# Patient Record
Sex: FEMALE | Race: OTHER | Employment: FULL TIME | URBAN - METROPOLITAN AREA
[De-identification: names, ages, dates, MRNs, and addresses within clinical notes are randomized per-mention and may not be internally consistent; named-entity substitution may affect disease eponyms.]

---

## 2020-09-15 ENCOUNTER — HOSPITAL ENCOUNTER (OUTPATIENT)
Age: 40
Discharge: HOME | End: 2020-09-15
Attending: EMERGENCY MEDICINE
Payer: COMMERCIAL

## 2020-09-15 VITALS
RESPIRATION RATE: 16 BRPM | BODY MASS INDEX: 31.58 KG/M2 | HEART RATE: 96 BPM | OXYGEN SATURATION: 98 % | DIASTOLIC BLOOD PRESSURE: 68 MMHG | SYSTOLIC BLOOD PRESSURE: 127 MMHG | WEIGHT: 185 LBS | HEIGHT: 64 IN | TEMPERATURE: 97 F

## 2020-09-15 DIAGNOSIS — Z20.822 EXPOSURE TO COVID-19 VIRUS: Primary | ICD-10-CM

## 2020-09-15 PROCEDURE — U0003 INFECTIOUS AGENT DETECTION BY NUCLEIC ACID (DNA OR RNA); SEVERE ACUTE RESPIRATORY SYNDROME CORONAVIRUS 2 (SARS-COV-2) (CORONAVIRUS DISEASE [COVID-19]), AMPLIFIED PROBE TECHNIQUE, MAKING USE OF HIGH THROUGHPUT TECHNOLOGIES AS DESCRIBED BY CMS-2020-01-R: HCPCS | Performed by: EMERGENCY MEDICINE

## 2020-09-15 PROCEDURE — 99202 OFFICE O/P NEW SF 15 MIN: CPT | Performed by: EMERGENCY MEDICINE

## 2020-09-15 NOTE — ED PROVIDER NOTES
Patient Seen in: [facility] In [address] [place] [address]      History   Patient presents with:  Testing    Stated Complaint: exposed/wants testing    HPI  Patient states she was exposed to work to an individual who has tested positive.   She last saw rhythm. Heart sounds: Normal heart sounds. Pulmonary:      Effort: Pulmonary effort is normal.      Breath sounds: Normal breath sounds. Abdominal:      Palpations: Abdomen is soft. Tenderness: There is no tenderness.    Musculoskeletal: Primitivo Magaña

## 2020-09-20 LAB — SARS-COV-2 BY PCR: NOT DETECTED

## 2021-05-10 ENCOUNTER — OFFICE VISIT (OUTPATIENT)
Dept: FAMILY MEDICINE CLINIC | Facility: CLINIC | Age: 41
End: 2021-05-10
Payer: COMMERCIAL

## 2021-05-10 VITALS
HEIGHT: 64 IN | BODY MASS INDEX: 35.51 KG/M2 | DIASTOLIC BLOOD PRESSURE: 77 MMHG | SYSTOLIC BLOOD PRESSURE: 120 MMHG | HEART RATE: 90 BPM | WEIGHT: 208 LBS | TEMPERATURE: 97 F

## 2021-05-10 DIAGNOSIS — R10.12 LUQ ABDOMINAL PAIN: Primary | ICD-10-CM

## 2021-05-10 DIAGNOSIS — Z98.890 HISTORY OF REMOVAL OF OVARIAN CYST: ICD-10-CM

## 2021-05-10 DIAGNOSIS — Z12.31 ENCOUNTER FOR SCREENING MAMMOGRAM FOR MALIGNANT NEOPLASM OF BREAST: ICD-10-CM

## 2021-05-10 DIAGNOSIS — Z87.42 HISTORY OF REMOVAL OF OVARIAN CYST: ICD-10-CM

## 2021-05-10 PROCEDURE — 3008F BODY MASS INDEX DOCD: CPT | Performed by: FAMILY MEDICINE

## 2021-05-10 PROCEDURE — 3074F SYST BP LT 130 MM HG: CPT | Performed by: FAMILY MEDICINE

## 2021-05-10 PROCEDURE — 99204 OFFICE O/P NEW MOD 45 MIN: CPT | Performed by: FAMILY MEDICINE

## 2021-05-10 PROCEDURE — 3078F DIAST BP <80 MM HG: CPT | Performed by: FAMILY MEDICINE

## 2021-05-10 RX ORDER — AMOXICILLIN AND CLAVULANATE POTASSIUM 875; 125 MG/1; MG/1
1 TABLET, FILM COATED ORAL EVERY 12 HOURS
COMMUNITY
Start: 2021-04-20 | End: 2021-05-10

## 2021-05-10 RX ORDER — CHOLECALCIFEROL (VITAMIN D3) 1250 MCG
1 CAPSULE ORAL WEEKLY
COMMUNITY
Start: 2021-04-21 | End: 2022-01-28

## 2021-05-10 NOTE — PROGRESS NOTES
HPI:   Mirela Flowers is a 36year old female who presents for an establish care visit and with complaints of acute abdominal pain that started 6 weeks ago.     Complaining of L side upper quadrant abdominal pain described as a dull ache that is non-radi lb (94.3 kg)   LMP 05/01/2021 (Approximate)   BMI 35.70 kg/m²     GENERAL: well developed, well nourished, in no apparent distress  SKIN: no rashes, no suspicious lesions  HEENT: atraumatic, normocephalic, ears are clear  EYES: PERRLA, EOMI,conjunctiva are

## 2021-05-12 ENCOUNTER — TELEPHONE (OUTPATIENT)
Dept: FAMILY MEDICINE CLINIC | Facility: CLINIC | Age: 41
End: 2021-05-12

## 2021-05-12 NOTE — TELEPHONE ENCOUNTER
Patient's  called and advised the patient wants to have the CT Scan done at an imaging site closer to them. Patient is going to go to [address]. Which is not far from their house.  Patient wanted to know if we could put the

## 2021-05-14 ENCOUNTER — TELEPHONE (OUTPATIENT)
Dept: FAMILY MEDICINE CLINIC | Facility: CLINIC | Age: 41
End: 2021-05-14

## 2021-05-14 NOTE — TELEPHONE ENCOUNTER
I spoke to patient and she said she received a letter for work with weight restrictions but it doesn't say the amount of weight she can lift.  Can she have a letter stating the weight restrictions

## 2021-05-14 NOTE — TELEPHONE ENCOUNTER
Spoke to patient to inform her that her order was sent through [facility] so she can print it out.

## 2021-05-14 NOTE — TELEPHONE ENCOUNTER
Okay to generate work note stating 10 pound lifting restriction. Please inform patient/leave for pickup/MyChart depending on patient preference.

## 2021-05-15 ENCOUNTER — HOSPITAL ENCOUNTER (OUTPATIENT)
Dept: MAMMOGRAPHY | Age: 41
Discharge: HOME | End: 2021-05-15
Attending: FAMILY MEDICINE
Payer: COMMERCIAL

## 2021-05-15 DIAGNOSIS — Z12.31 ENCOUNTER FOR SCREENING MAMMOGRAM FOR MALIGNANT NEOPLASM OF BREAST: ICD-10-CM

## 2021-05-15 PROCEDURE — 77063 BREAST TOMOSYNTHESIS BI: CPT | Performed by: FAMILY MEDICINE

## 2021-05-15 PROCEDURE — 77067 SCR MAMMO BI INCL CAD: CPT | Performed by: FAMILY MEDICINE

## 2021-06-02 ENCOUNTER — TELEPHONE (OUTPATIENT)
Dept: CASE MANAGEMENT | Age: 41
End: 2021-06-02

## 2021-06-02 NOTE — TELEPHONE ENCOUNTER
Renee Granda from [facility] calling in regards to pt CT. Renee Granda looking for authorization. Please advise pt.

## 2021-06-03 ENCOUNTER — TELEPHONE (OUTPATIENT)
Dept: FAMILY MEDICINE CLINIC | Facility: CLINIC | Age: 41
End: 2021-06-03

## 2021-06-03 NOTE — TELEPHONE ENCOUNTER
Denial     CT Abdomen and Pelvis     The health plan has denied request for above test, as criteria not met. If you wish you may contact [facility] for a physician review at [phone]. Also, please call patient with plan of care.      Thank you, Cruzito

## 2021-06-14 ENCOUNTER — TELEPHONE (OUTPATIENT)
Dept: FAMILY MEDICINE CLINIC | Facility: CLINIC | Age: 41
End: 2021-06-14

## 2021-06-14 NOTE — TELEPHONE ENCOUNTER
Patient's spouse, on ASHLEY, is requesting a call to discuss CT results. Per spouse, patient completed test on 6/4 at [facility]. Please advise.

## 2021-06-15 NOTE — TELEPHONE ENCOUNTER
Left message for patient. Need to know what location patient went to do the CT scan in order to try to request results.

## 2021-06-16 NOTE — TELEPHONE ENCOUNTER
Clinical Staff on site: [address] [place] [address] [address]. Closed. Hrs 8:30-4:30pm.      However, recording does state if doctors office is calling for request, FAX Request to Medical [place] dept at [address]  Please assist.

## 2021-06-16 NOTE — TELEPHONE ENCOUNTER
Patient went to [facility] in [facility].  Patient does not remember exact address

## 2021-06-18 ENCOUNTER — LAB ENCOUNTER (OUTPATIENT)
Dept: LAB | Age: 41
End: 2021-06-18
Attending: FAMILY MEDICINE
Payer: COMMERCIAL

## 2021-06-18 ENCOUNTER — OFFICE VISIT (OUTPATIENT)
Dept: FAMILY MEDICINE CLINIC | Facility: CLINIC | Age: 41
End: 2021-06-18
Payer: COMMERCIAL

## 2021-06-18 VITALS
BODY MASS INDEX: 35.17 KG/M2 | TEMPERATURE: 98 F | HEIGHT: 64 IN | WEIGHT: 206 LBS | DIASTOLIC BLOOD PRESSURE: 78 MMHG | SYSTOLIC BLOOD PRESSURE: 121 MMHG | HEART RATE: 91 BPM

## 2021-06-18 DIAGNOSIS — R10.12 LUQ ABDOMINAL PAIN: ICD-10-CM

## 2021-06-18 DIAGNOSIS — K76.0 HEPATIC STEATOSIS: ICD-10-CM

## 2021-06-18 DIAGNOSIS — R73.03 PREDIABETES: ICD-10-CM

## 2021-06-18 DIAGNOSIS — Z00.00 ANNUAL PHYSICAL EXAM: ICD-10-CM

## 2021-06-18 DIAGNOSIS — I51.7 CARDIOMEGALY: ICD-10-CM

## 2021-06-18 DIAGNOSIS — Z00.00 ANNUAL PHYSICAL EXAM: Primary | ICD-10-CM

## 2021-06-18 DIAGNOSIS — Q63.1 HORSESHOE KIDNEY: ICD-10-CM

## 2021-06-18 DIAGNOSIS — R12 HEARTBURN: ICD-10-CM

## 2021-06-18 DIAGNOSIS — R06.02 SOB (SHORTNESS OF BREATH) ON EXERTION: ICD-10-CM

## 2021-06-18 PROCEDURE — 83036 HEMOGLOBIN GLYCOSYLATED A1C: CPT

## 2021-06-18 PROCEDURE — 99396 PREV VISIT EST AGE 40-64: CPT | Performed by: FAMILY MEDICINE

## 2021-06-18 PROCEDURE — 3074F SYST BP LT 130 MM HG: CPT | Performed by: FAMILY MEDICINE

## 2021-06-18 PROCEDURE — 3051F HG A1C>EQUAL 7.0%<8.0%: CPT | Performed by: FAMILY MEDICINE

## 2021-06-18 PROCEDURE — 80053 COMPREHEN METABOLIC PANEL: CPT

## 2021-06-18 PROCEDURE — 3078F DIAST BP <80 MM HG: CPT | Performed by: FAMILY MEDICINE

## 2021-06-18 PROCEDURE — 80061 LIPID PANEL: CPT

## 2021-06-18 PROCEDURE — 3008F BODY MASS INDEX DOCD: CPT | Performed by: FAMILY MEDICINE

## 2021-06-18 PROCEDURE — 99213 OFFICE O/P EST LOW 20 MIN: CPT | Performed by: FAMILY MEDICINE

## 2021-06-18 PROCEDURE — 84443 ASSAY THYROID STIM HORMONE: CPT

## 2021-06-18 PROCEDURE — 85025 COMPLETE CBC W/AUTO DIFF WBC: CPT

## 2021-06-18 PROCEDURE — 82306 VITAMIN D 25 HYDROXY: CPT

## 2021-06-18 PROCEDURE — 36415 COLL VENOUS BLD VENIPUNCTURE: CPT

## 2021-06-18 RX ORDER — PANTOPRAZOLE SODIUM 40 MG/1
40 TABLET, DELAYED RELEASE ORAL
Qty: 30 TABLET | Refills: 0 | Status: SHIPPED | OUTPATIENT
Start: 2021-06-18 | End: 2021-10-29

## 2021-06-18 NOTE — PROGRESS NOTES
HPI:   Isrrael Joseph is a 36year old female who presents for a complete physical exam.    Work: Works as a factory  Social: Lives with family. Diet: eats home cooked meals  Exercise: Sedentary   GYN history:   LMP: 21  Periods are regular. [facility] denies orthopnea  CARDIOVASCULAR: denies chest pain on exertion  GI: denies abdominal pain,denies heartburn  : denies dysuria, vaginal discharge or itching  MUSCULOSKELETAL: denies back pain  NEURO: denies headaches  PSYCHE: denies depression or anxiety CTAP    7. SOB (shortness of breath) on exertion  -Cardiomegaly noted on CTAP  - CARD ECHO 2D DOPPLER (CPT=93306); Future    8. LUQ abdominal pain  -CTAP results discussed with patient. Advised patient to follow-up with GI for possible endoscopy.

## 2021-06-25 PROBLEM — E11.65 TYPE 2 DIABETES MELLITUS WITH HYPERGLYCEMIA, WITHOUT LONG-TERM CURRENT USE OF INSULIN (HCC): Status: ACTIVE | Noted: 2021-06-25

## 2021-06-25 PROBLEM — R73.03 PREDIABETES: Status: RESOLVED | Noted: 2021-06-18 | Resolved: 2021-06-25

## 2021-06-25 PROBLEM — E78.2 MIXED HYPERLIPIDEMIA: Status: ACTIVE | Noted: 2021-06-25

## 2021-06-28 ENCOUNTER — TELEPHONE (OUTPATIENT)
Dept: FAMILY MEDICINE CLINIC | Facility: CLINIC | Age: 41
End: 2021-06-28

## 2021-06-28 NOTE — TELEPHONE ENCOUNTER
states just spoke with nurse and was asked to schedule a follow up appointment for patient to discus results. Patient is able to get off work today to come in.  was advised Dr. Muna Husain does not have any openings today but availability as soon as tomorrow.  would like to know if doctor is able to accommodate today as patient unable to get off work all other days.  was advised message will be sent.

## 2021-06-28 NOTE — TELEPHONE ENCOUNTER
Not able to accommodate patient today. Visit is for lab work follow-up for new diabetes diagnosis. Not urgent, does not need to be seen in the next 24 to 48 hours, can be seen anytime this week or next week. I am here Thursday evenings which may work better for her work schedule.

## 2021-07-01 ENCOUNTER — OFFICE VISIT (OUTPATIENT)
Dept: FAMILY MEDICINE CLINIC | Facility: CLINIC | Age: 41
End: 2021-07-01
Payer: COMMERCIAL

## 2021-07-01 VITALS
TEMPERATURE: 98 F | DIASTOLIC BLOOD PRESSURE: 80 MMHG | BODY MASS INDEX: 35.85 KG/M2 | WEIGHT: 210 LBS | HEIGHT: 64 IN | SYSTOLIC BLOOD PRESSURE: 122 MMHG | HEART RATE: 105 BPM

## 2021-07-01 DIAGNOSIS — K76.0 HEPATIC STEATOSIS: ICD-10-CM

## 2021-07-01 DIAGNOSIS — E78.2 MIXED HYPERLIPIDEMIA: Primary | ICD-10-CM

## 2021-07-01 DIAGNOSIS — E11.65 TYPE 2 DIABETES MELLITUS WITH HYPERGLYCEMIA, WITHOUT LONG-TERM CURRENT USE OF INSULIN (HCC): ICD-10-CM

## 2021-07-01 DIAGNOSIS — R10.12 LUQ ABDOMINAL PAIN: ICD-10-CM

## 2021-07-01 PROCEDURE — 3079F DIAST BP 80-89 MM HG: CPT | Performed by: FAMILY MEDICINE

## 2021-07-01 PROCEDURE — 3074F SYST BP LT 130 MM HG: CPT | Performed by: FAMILY MEDICINE

## 2021-07-01 PROCEDURE — 3008F BODY MASS INDEX DOCD: CPT | Performed by: FAMILY MEDICINE

## 2021-07-01 PROCEDURE — 99214 OFFICE O/P EST MOD 30 MIN: CPT | Performed by: FAMILY MEDICINE

## 2021-07-01 RX ORDER — LISINOPRIL 10 MG/1
10 TABLET ORAL DAILY
Qty: 90 TABLET | Refills: 0 | Status: SHIPPED | OUTPATIENT
Start: 2021-07-01 | End: 2022-06-26

## 2021-07-01 RX ORDER — ATORVASTATIN CALCIUM 20 MG/1
20 TABLET, FILM COATED ORAL NIGHTLY
Qty: 90 TABLET | Refills: 0 | Status: SHIPPED | OUTPATIENT
Start: 2021-07-01 | End: 2021-10-29

## 2021-07-01 NOTE — PROGRESS NOTES
Patient presents with: Follow - Up: lab results, continues to have LUQ pain    HPI:   Bean Claros is a 36year old female who presents to clinic for lab work follow-up. New diagnosis of type 2 diabetes, mixed hyperlipidemia.   Patient's last A1c was fruits, vegetables, fiber, calcium, iron, and 1 mg of folate supplement per day (for females capable of pregnancy). - Exercise: Stressed the importance of regular exercise. - metFORMIN HCl 500 MG Oral Tab;  Take 1 tablet (500 mg total) by mouth daily wi

## 2021-10-29 ENCOUNTER — LAB ENCOUNTER (OUTPATIENT)
Dept: LAB | Age: 41
End: 2021-10-29
Attending: FAMILY MEDICINE
Payer: COMMERCIAL

## 2021-10-29 ENCOUNTER — OFFICE VISIT (OUTPATIENT)
Dept: FAMILY MEDICINE CLINIC | Facility: CLINIC | Age: 41
End: 2021-10-29
Payer: COMMERCIAL

## 2021-10-29 VITALS
HEART RATE: 80 BPM | DIASTOLIC BLOOD PRESSURE: 72 MMHG | WEIGHT: 206.63 LBS | BODY MASS INDEX: 35.28 KG/M2 | HEIGHT: 64 IN | TEMPERATURE: 98 F | SYSTOLIC BLOOD PRESSURE: 109 MMHG

## 2021-10-29 DIAGNOSIS — E11.65 TYPE 2 DIABETES MELLITUS WITH HYPERGLYCEMIA, WITHOUT LONG-TERM CURRENT USE OF INSULIN (HCC): ICD-10-CM

## 2021-10-29 DIAGNOSIS — M25.512 ACUTE PAIN OF BOTH SHOULDERS: ICD-10-CM

## 2021-10-29 DIAGNOSIS — K76.0 HEPATIC STEATOSIS: ICD-10-CM

## 2021-10-29 DIAGNOSIS — R05.9 COUGH: Primary | ICD-10-CM

## 2021-10-29 DIAGNOSIS — M25.511 ACUTE PAIN OF BOTH SHOULDERS: ICD-10-CM

## 2021-10-29 DIAGNOSIS — R12 HEARTBURN: ICD-10-CM

## 2021-10-29 DIAGNOSIS — E78.2 MIXED HYPERLIPIDEMIA: ICD-10-CM

## 2021-10-29 PROCEDURE — 83036 HEMOGLOBIN GLYCOSYLATED A1C: CPT

## 2021-10-29 PROCEDURE — 80061 LIPID PANEL: CPT

## 2021-10-29 PROCEDURE — 36415 COLL VENOUS BLD VENIPUNCTURE: CPT

## 2021-10-29 PROCEDURE — 3074F SYST BP LT 130 MM HG: CPT | Performed by: FAMILY MEDICINE

## 2021-10-29 PROCEDURE — 3078F DIAST BP <80 MM HG: CPT | Performed by: FAMILY MEDICINE

## 2021-10-29 PROCEDURE — 3008F BODY MASS INDEX DOCD: CPT | Performed by: FAMILY MEDICINE

## 2021-10-29 PROCEDURE — 99214 OFFICE O/P EST MOD 30 MIN: CPT | Performed by: FAMILY MEDICINE

## 2021-10-29 PROCEDURE — 3044F HG A1C LEVEL LT 7.0%: CPT | Performed by: PHYSICIAN ASSISTANT

## 2021-10-29 PROCEDURE — 80053 COMPREHEN METABOLIC PANEL: CPT

## 2021-10-29 RX ORDER — ATORVASTATIN CALCIUM 20 MG/1
20 TABLET, FILM COATED ORAL NIGHTLY
Qty: 90 TABLET | Refills: 0 | Status: SHIPPED | OUTPATIENT
Start: 2021-10-29 | End: 2022-01-24

## 2021-10-29 RX ORDER — PANTOPRAZOLE SODIUM 40 MG/1
40 TABLET, DELAYED RELEASE ORAL
Qty: 90 TABLET | Refills: 0 | Status: SHIPPED | OUTPATIENT
Start: 2021-10-29 | End: 2022-01-24

## 2021-10-29 RX ORDER — LEVOCETIRIZINE DIHYDROCHLORIDE 5 MG/1
5 TABLET, FILM COATED ORAL EVERY EVENING
Qty: 30 TABLET | Refills: 0 | Status: SHIPPED | OUTPATIENT
Start: 2021-10-29

## 2021-10-29 NOTE — PROGRESS NOTES
Patient presents with: Follow - Up: Diabetes  Cough: Dry cough causing vomit    HPI:   Suni Blas is a 36year old female who presents to clinic for diabetes follow-up. Her last A1c was 7% was started on Metformin 500 daily.   States that she has n Dispense: 90 tablet; Refill: 0  - LIPID PANEL; Future    3. Hepatic steatosis  - atorvastatin 20 MG Oral Tab; Take 1 tablet (20 mg total) by mouth nightly. Dispense: 90 tablet; Refill: 0  - COMP METABOLIC PANEL (14); Future    4.  Heartburn  - pantoprazole

## 2021-11-05 ENCOUNTER — OFFICE VISIT (OUTPATIENT)
Dept: FAMILY MEDICINE CLINIC | Facility: CLINIC | Age: 41
End: 2021-11-05
Payer: COMMERCIAL

## 2021-11-05 ENCOUNTER — TELEPHONE (OUTPATIENT)
Dept: FAMILY MEDICINE CLINIC | Facility: CLINIC | Age: 41
End: 2021-11-05

## 2021-11-05 ENCOUNTER — NURSE TRIAGE (OUTPATIENT)
Dept: FAMILY MEDICINE CLINIC | Facility: CLINIC | Age: 41
End: 2021-11-05

## 2021-11-05 VITALS
WEIGHT: 207.56 LBS | HEART RATE: 76 BPM | DIASTOLIC BLOOD PRESSURE: 82 MMHG | SYSTOLIC BLOOD PRESSURE: 122 MMHG | HEIGHT: 64 IN | BODY MASS INDEX: 35.44 KG/M2

## 2021-11-05 DIAGNOSIS — M79.601 RIGHT ARM PAIN: Primary | ICD-10-CM

## 2021-11-05 PROCEDURE — 99213 OFFICE O/P EST LOW 20 MIN: CPT | Performed by: PHYSICIAN ASSISTANT

## 2021-11-05 PROCEDURE — 3074F SYST BP LT 130 MM HG: CPT | Performed by: PHYSICIAN ASSISTANT

## 2021-11-05 PROCEDURE — 3008F BODY MASS INDEX DOCD: CPT | Performed by: PHYSICIAN ASSISTANT

## 2021-11-05 PROCEDURE — 3079F DIAST BP 80-89 MM HG: CPT | Performed by: PHYSICIAN ASSISTANT

## 2021-11-05 NOTE — PROGRESS NOTES
HPI:     HPI   36year-old female is here in the office complaining of right arm pain for the past week after blood draw. Patient states that she has pain and swelling in the right hand, patient has been taking Tylenol and icing.  The symptoms are getting b Date   •      • Other surgical history  2017    ovarian cyst       Family History:   History reviewed. No pertinent family history.     Social History:   Social History    Socioeconomic History      Marital status:       Spouse name: Not Places Lived in the Last Year: Not on file      Unstable Housing in the Last Year: Not on file    Review of Systems:   Review of Systems   Constitutional: Negative for activity change, chills, fatigue and fever.    HENT: Negative for congestion, ear dischar Relevant Orders    US ARM RIGHT COMPLETE (YFL=[id number])      Advise patient to take Ibuprofen 600 mg PO TID with food and apply ice compresses. Discussed plan of care with pt and pt is in agreement. All questions answered.  Pt to call with questions or kan

## 2021-11-05 NOTE — TELEPHONE ENCOUNTER
Action Requested: Summary for Provider     []  Critical Lab, Recommendations Needed  [] Need Additional Advice  [x]   FYI    []   Need Orders  [] Need Medications Sent to Pharmacy  []  Other     SUMMARY: With  NC#[id number]    Patient stat

## 2021-11-05 NOTE — TELEPHONE ENCOUNTER
Ruthie/ [facility] needs to clarify the order for patient's ultrasound of the right arm. Patient has an appointment today 11/05 at 3:00pm.    CSS contacted site, site advised patient is going to [facility] instead.  Please disregard

## 2022-01-24 DIAGNOSIS — K76.0 HEPATIC STEATOSIS: ICD-10-CM

## 2022-01-24 DIAGNOSIS — R12 HEARTBURN: ICD-10-CM

## 2022-01-24 DIAGNOSIS — E78.2 MIXED HYPERLIPIDEMIA: ICD-10-CM

## 2022-01-24 DIAGNOSIS — E11.65 TYPE 2 DIABETES MELLITUS WITH HYPERGLYCEMIA, WITHOUT LONG-TERM CURRENT USE OF INSULIN (HCC): ICD-10-CM

## 2022-01-24 NOTE — TELEPHONE ENCOUNTER
Please review. Protocol failed or has no protocol.     Requested Prescriptions   Pending Prescriptions Disp Refills    METFORMIN 500 MG Oral Tab [Pharmacy Med Name: METFORMIN 500MG TABLETS] 180 tablet 0     Sig: TAKE 1 TABLET(500 MG) BY MOUTH TWICE DAILY WI Sarah Jacobs MD    Office Visit    8 months ago LUQ abdominal pain    Bayron Wilder MD    Office Visit

## 2022-01-25 RX ORDER — ATORVASTATIN CALCIUM 20 MG/1
20 TABLET, FILM COATED ORAL NIGHTLY
Qty: 90 TABLET | Refills: 1 | Status: SHIPPED | OUTPATIENT
Start: 2022-01-25 | End: 2022-01-28

## 2022-01-25 RX ORDER — PANTOPRAZOLE SODIUM 40 MG/1
40 TABLET, DELAYED RELEASE ORAL
Qty: 90 TABLET | Refills: 1 | Status: SHIPPED | OUTPATIENT
Start: 2022-01-25 | End: 2022-01-28

## 2022-01-28 ENCOUNTER — OFFICE VISIT (OUTPATIENT)
Dept: FAMILY MEDICINE CLINIC | Facility: CLINIC | Age: 42
End: 2022-01-28
Payer: COMMERCIAL

## 2022-01-28 VITALS
TEMPERATURE: 99 F | SYSTOLIC BLOOD PRESSURE: 121 MMHG | BODY MASS INDEX: 35 KG/M2 | HEIGHT: 64 IN | WEIGHT: 205 LBS | HEART RATE: 87 BPM | DIASTOLIC BLOOD PRESSURE: 84 MMHG

## 2022-01-28 DIAGNOSIS — G44.209 ACUTE NON INTRACTABLE TENSION-TYPE HEADACHE: ICD-10-CM

## 2022-01-28 DIAGNOSIS — E55.9 VITAMIN D DEFICIENCY: Primary | ICD-10-CM

## 2022-01-28 DIAGNOSIS — E11.65 TYPE 2 DIABETES MELLITUS WITH HYPERGLYCEMIA, WITHOUT LONG-TERM CURRENT USE OF INSULIN (HCC): ICD-10-CM

## 2022-01-28 DIAGNOSIS — R12 HEARTBURN: ICD-10-CM

## 2022-01-28 DIAGNOSIS — K76.0 HEPATIC STEATOSIS: ICD-10-CM

## 2022-01-28 DIAGNOSIS — E78.2 MIXED HYPERLIPIDEMIA: ICD-10-CM

## 2022-01-28 LAB
CARTRIDGE LOT#: ABNORMAL NUMERIC
HEMOGLOBIN A1C: 6.8 % (ref 4.3–5.6)

## 2022-01-28 PROCEDURE — 83036 HEMOGLOBIN GLYCOSYLATED A1C: CPT | Performed by: FAMILY MEDICINE

## 2022-01-28 PROCEDURE — 3008F BODY MASS INDEX DOCD: CPT | Performed by: FAMILY MEDICINE

## 2022-01-28 PROCEDURE — 36415 COLL VENOUS BLD VENIPUNCTURE: CPT | Performed by: FAMILY MEDICINE

## 2022-01-28 PROCEDURE — 3074F SYST BP LT 130 MM HG: CPT | Performed by: FAMILY MEDICINE

## 2022-01-28 PROCEDURE — 3044F HG A1C LEVEL LT 7.0%: CPT | Performed by: FAMILY MEDICINE

## 2022-01-28 PROCEDURE — 99214 OFFICE O/P EST MOD 30 MIN: CPT | Performed by: FAMILY MEDICINE

## 2022-01-28 PROCEDURE — 3079F DIAST BP 80-89 MM HG: CPT | Performed by: FAMILY MEDICINE

## 2022-01-28 RX ORDER — ATORVASTATIN CALCIUM 20 MG/1
20 TABLET, FILM COATED ORAL NIGHTLY
Qty: 90 TABLET | Refills: 1 | Status: SHIPPED | OUTPATIENT
Start: 2022-01-28

## 2022-01-28 RX ORDER — ERGOCALCIFEROL 1.25 MG/1
50000 CAPSULE ORAL WEEKLY
Qty: 12 CAPSULE | Refills: 0 | Status: SHIPPED | OUTPATIENT
Start: 2022-01-28 | End: 2022-04-16

## 2022-01-28 RX ORDER — PANTOPRAZOLE SODIUM 40 MG/1
40 TABLET, DELAYED RELEASE ORAL
Qty: 90 TABLET | Refills: 1 | Status: SHIPPED | OUTPATIENT
Start: 2022-01-28

## 2022-01-28 NOTE — PROGRESS NOTES
Patient presents with: Adverse Reaction: c/o bodyaches, fever, arm pain, headaches after covid vaccine x 2 days  Medication Follow-Up    HPI:   Jany Suresh is a 39year old female who presents to clinic for DM follow up.    She has been consistent wi Dispense: 90 tablet; Refill: 1    5. Vitamin D deficiency  - vitamin D supplementation sent to pharmacy. 6. Acute non intractable tension-type headache  - diclofenac 50 MG Oral Tab EC; Take 1 tablet (50 mg total) by mouth 2 (two) times daily as needed.

## 2022-05-09 ENCOUNTER — OFFICE VISIT (OUTPATIENT)
Dept: FAMILY MEDICINE CLINIC | Facility: CLINIC | Age: 42
End: 2022-05-09
Payer: COMMERCIAL

## 2022-05-09 ENCOUNTER — TELEPHONE (OUTPATIENT)
Dept: FAMILY MEDICINE CLINIC | Facility: CLINIC | Age: 42
End: 2022-05-09

## 2022-05-09 VITALS
HEIGHT: 64 IN | BODY MASS INDEX: 34.8 KG/M2 | SYSTOLIC BLOOD PRESSURE: 133 MMHG | WEIGHT: 203.81 LBS | TEMPERATURE: 97 F | DIASTOLIC BLOOD PRESSURE: 81 MMHG | HEART RATE: 85 BPM

## 2022-05-09 DIAGNOSIS — G44.229 CHRONIC TENSION-TYPE HEADACHE, NOT INTRACTABLE: Primary | ICD-10-CM

## 2022-05-09 DIAGNOSIS — R10.12 LUQ ABDOMINAL PAIN: ICD-10-CM

## 2022-05-09 PROCEDURE — 3008F BODY MASS INDEX DOCD: CPT | Performed by: FAMILY MEDICINE

## 2022-05-09 PROCEDURE — 3079F DIAST BP 80-89 MM HG: CPT | Performed by: FAMILY MEDICINE

## 2022-05-09 PROCEDURE — 3075F SYST BP GE 130 - 139MM HG: CPT | Performed by: FAMILY MEDICINE

## 2022-05-09 PROCEDURE — 99213 OFFICE O/P EST LOW 20 MIN: CPT | Performed by: FAMILY MEDICINE

## 2022-05-09 RX ORDER — CYCLOBENZAPRINE HCL 5 MG
5 TABLET ORAL NIGHTLY
Qty: 20 TABLET | Refills: 0 | Status: SHIPPED | OUTPATIENT
Start: 2022-05-09

## 2022-05-09 NOTE — TELEPHONE ENCOUNTER
[facility] [id number]. Identified patient's name and . Patient complains of symptoms since Wednesday May 3. Symptoms have included abdominal pain, vomiting, diarrhea and headache    Denies fever, shortness of breath, or cough. Last emesis was on . Last diarrhea was . Patient states she is requesting an appointment for a letter to be excused from work with clearance to return.     Future Appointments   Date Time Provider Spencer Rios   2022 11:45 AM [facility]

## 2022-05-26 DIAGNOSIS — E11.65 TYPE 2 DIABETES MELLITUS WITH HYPERGLYCEMIA, WITHOUT LONG-TERM CURRENT USE OF INSULIN (HCC): ICD-10-CM

## 2022-05-26 NOTE — TELEPHONE ENCOUNTER
Refill passed per [place] [facility] [place] protocol. Requested Prescriptions   Pending Prescriptions Disp Refills    metFORMIN 500 MG Oral Tab 180 tablet 1     Sig: Take 1 tablet (500 mg total) by mouth 2 (two) times daily with meals.         Diabetes Medication Protocol Passed - 5/26/2022 11:43 AM        Passed - Last A1C < 7.5 and within past 6 months     Lab Results   Component Value Date    A1C 6.8 (A) 01/28/2022               Passed - Appointment in past 6 or next 3 months        Passed - GFR Non- > 50     Lab Results   Component Value Date    GFRNAA 110 10/29/2021                 Passed - GFR in the past 12 months                   Recent Outpatient Visits              2 weeks ago Chronic tension-type headache, not intractable    [address] Mccurdy MD    Office Visit    3 months ago Vitamin D deficiency    [address] Mccurdy MD    Office Visit    6 months ago Right arm pain    [address] [facility] Emilia Crigler, PA-C    Office Visit    6 months ago Cough    Jayden Neves MD    Office Visit    10 months ago Mixed hyperlipidemia    [address] Mccurdy MD    Office Visit

## 2022-07-07 ENCOUNTER — LAB ENCOUNTER (OUTPATIENT)
Dept: LAB | Age: 42
End: 2022-07-07
Attending: NURSE PRACTITIONER
Payer: COMMERCIAL

## 2022-07-07 ENCOUNTER — APPOINTMENT (OUTPATIENT)
Dept: OCCUPATIONAL MEDICINE | Age: 42
End: 2022-07-07
Attending: NURSE PRACTITIONER
Payer: COMMERCIAL

## 2022-07-07 ENCOUNTER — OFFICE VISIT (OUTPATIENT)
Dept: FAMILY MEDICINE CLINIC | Facility: CLINIC | Age: 42
End: 2022-07-07
Payer: COMMERCIAL

## 2022-07-07 VITALS
HEART RATE: 83 BPM | SYSTOLIC BLOOD PRESSURE: 115 MMHG | WEIGHT: 207 LBS | BODY MASS INDEX: 35.34 KG/M2 | HEIGHT: 64 IN | DIASTOLIC BLOOD PRESSURE: 76 MMHG

## 2022-07-07 DIAGNOSIS — M79.641 RIGHT HAND PAIN: Primary | ICD-10-CM

## 2022-07-07 DIAGNOSIS — M79.641 RIGHT HAND PAIN: ICD-10-CM

## 2022-07-07 LAB — URATE SERPL-MCNC: 4.9 MG/DL

## 2022-07-07 PROCEDURE — 3078F DIAST BP <80 MM HG: CPT | Performed by: NURSE PRACTITIONER

## 2022-07-07 PROCEDURE — 84550 ASSAY OF BLOOD/URIC ACID: CPT

## 2022-07-07 PROCEDURE — 99213 OFFICE O/P EST LOW 20 MIN: CPT | Performed by: NURSE PRACTITIONER

## 2022-07-07 PROCEDURE — 3074F SYST BP LT 130 MM HG: CPT | Performed by: NURSE PRACTITIONER

## 2022-07-07 PROCEDURE — 36415 COLL VENOUS BLD VENIPUNCTURE: CPT

## 2022-07-07 PROCEDURE — 3008F BODY MASS INDEX DOCD: CPT | Performed by: NURSE PRACTITIONER

## 2022-07-07 RX ORDER — METHYLPREDNISOLONE 4 MG/1
TABLET ORAL
Qty: 21 EACH | Refills: 0 | Status: SHIPPED | OUTPATIENT
Start: 2022-07-07

## 2022-09-29 DIAGNOSIS — R12 HEARTBURN: ICD-10-CM

## 2022-09-29 DIAGNOSIS — E11.65 TYPE 2 DIABETES MELLITUS WITH HYPERGLYCEMIA, WITHOUT LONG-TERM CURRENT USE OF INSULIN (HCC): ICD-10-CM

## 2022-09-29 DIAGNOSIS — E78.2 MIXED HYPERLIPIDEMIA: ICD-10-CM

## 2022-09-29 DIAGNOSIS — K76.0 HEPATIC STEATOSIS: ICD-10-CM

## 2022-09-29 NOTE — TELEPHONE ENCOUNTER
Dakota, pt's spouse on the phone stating the pt needs refills on the following medications. Pt is out of medication.  Please advise        metFORMIN 500 MG Oral Tab    pantoprazole 40 MG Oral Tab EC    atorvastatin 20 MG Oral Tab

## 2022-10-01 RX ORDER — PANTOPRAZOLE SODIUM 40 MG/1
40 TABLET, DELAYED RELEASE ORAL
Qty: 90 TABLET | Refills: 1 | Status: SHIPPED | OUTPATIENT
Start: 2022-10-01

## 2022-10-01 RX ORDER — ATORVASTATIN CALCIUM 20 MG/1
20 TABLET, FILM COATED ORAL NIGHTLY
Qty: 90 TABLET | Refills: 1 | Status: SHIPPED | OUTPATIENT
Start: 2022-10-01

## 2022-10-01 NOTE — TELEPHONE ENCOUNTER
Please review. Protocol Failed or has no Protocol  Requested Prescriptions   Pending Prescriptions Disp Refills    atorvastatin 20 MG Oral Tab 90 tablet 1     Sig: Take 1 tablet (20 mg total) by mouth nightly.         Cholesterol Medication Protocol Failed - 10/1/2022  9:23 AM        Failed - Last LDL < 130     Lab Results   Component Value Date     (H) 10/29/2021               Passed - ALT in past 12 months        Passed - LDL in past 12 months        Passed - Last ALT < 80       Lab Results   Component Value Date    ALT 32 10/29/2021             Passed - In person appointment or virtual visit in the past 12 mos or appointment in next 3 mos       Recent Outpatient Visits              2 months ago Right hand pain    [address], [address] [facility] Deanna Roman APRN    Office Visit    4 months ago Chronic tension-type headache, not intractable    [address], Sharyn Canada MD    Office Visit    8 months ago Vitamin D deficiency    [address], Clarice Snellen, MD    Office Visit    11 months ago Right arm pain    [address] [facility] Talha Fall PA-C    Office Visit    11 months ago Cough    [address], Clarice Snellen, MD    Office Visit                   Signed Prescriptions Disp Refills    pantoprazole 40 MG Oral Tab EC 90 tablet 1     Sig: Take 1 tablet (40 mg total) by mouth before breakfast.        Gastrointestional Medication Protocol Passed - 10/1/2022  9:23 AM        Passed - In person appointment or virtual visit in the past 12 mos or appointment in next 3 mos       Recent Outpatient Visits              2 months ago Right hand pain    [address], [address] [facility], [address] [place] [address] Visit    4 months ago Chronic tension-type headache, not intractable    [address] [facility], Sharyn Canada MD    Office Visit    8 months ago Vitamin D deficiency [address] Mccurdy MD    Office Visit    11 months ago Right arm pain    [address] [place], [place]    Office Visit    11 months ago Cough    [facility], [address], Charmayne Gloss, MD    Office Visit                       Recent Outpatient Visits              2 months ago Right hand pain    [address], [address] [place] [address]    Office Visit    4 months ago Chronic tension-type headache, not intractable    [address] Mccurdy MD    Office Visit    8 months ago Vitamin D deficiency    [address], Charmayne Gloss, MD    Office Visit    11 months ago Right arm pain    [address] [facility] Mikaela Pritchett PA-C    Office Visit    11 months ago Adrian Lin MD    Office Visit

## 2022-10-01 NOTE — TELEPHONE ENCOUNTER
Refill passed per [facility] protocol. Requested Prescriptions   Pending Prescriptions Disp Refills    pantoprazole 40 MG Oral Tab EC 90 tablet 1     Sig: Take 1 tablet (40 mg total) by mouth before breakfast.        Gastrointestional Medication Protocol Passed - 10/1/2022  9:23 AM        Passed - In person appointment or virtual visit in the past 12 mos or appointment in next 3 mos       Recent Outpatient Visits              2 months ago Right hand pain    [facility], [facility], [address] [facility], APRN    Office Visit    4 months ago Chronic tension-type headache, not intractable    Gris Lao MD    Office Visit    8 months ago Vitamin D deficiency    [facility], [facility], Deborah Manzo MD    Office Visit    11 months ago Right arm pain    [address] Emilia Crigler, [place]    Office Visit    11 months ago Cough    Jayden Neves MD    Office Visit                    atorvastatin 20 MG Oral Tab 90 tablet 1     Sig: Take 1 tablet (20 mg total) by mouth nightly.         Cholesterol Medication Protocol Failed - 10/1/2022  9:23 AM        Failed - Last LDL < 130     Lab Results   Component Value Date     (H) 10/29/2021               Passed - ALT in past 12 months        Passed - LDL in past 12 months        Passed - Last ALT < 80       Lab Results   Component Value Date    ALT 32 10/29/2021             Passed - In person appointment or virtual visit in the past 12 mos or appointment in next 3 mos       Recent Outpatient Visits              2 months ago Right hand pain    [facility], [facility], [address] [facility], APRN    Office Visit    4 months ago Chronic tension-type headache, not intractable    Gris Lao MD    Office Visit    8 months ago Vitamin D deficiency    [facility], [facility], [address] [place] [address] Court, Margy Hanson MD    Office Visit    11 months ago Right arm pain    [address] [place], [place]    Office Visit    11 months ago [facility], [facility], Eliza Hdz MD    Office Visit                       Recent Outpatient Visits              2 months ago Right hand pain    Tanja Will, [address] [place] [address]    Office Visit    4 months ago Chronic tension-type headache, not intractable    [address], Abdullahi Reese MD    Office Visit    8 months ago Vitamin D deficiency    Eliza Iglesias MD    Office Visit    11 months ago Right arm pain    [address] [facility] Mary Ann Griffith PA-C    Office Visit    11 months ago Edwin Mckeon MD    Office Visit

## 2023-02-21 ENCOUNTER — NURSE TRIAGE (OUTPATIENT)
Dept: FAMILY MEDICINE CLINIC | Facility: CLINIC | Age: 43
End: 2023-02-21

## 2023-06-07 ENCOUNTER — NURSE TRIAGE (OUTPATIENT)
Dept: FAMILY MEDICINE CLINIC | Facility: CLINIC | Age: 43
End: 2023-06-07

## 2023-07-18 DIAGNOSIS — E11.65 TYPE 2 DIABETES MELLITUS WITH HYPERGLYCEMIA, WITHOUT LONG-TERM CURRENT USE OF INSULIN (HCC): ICD-10-CM

## 2023-07-18 DIAGNOSIS — E78.2 MIXED HYPERLIPIDEMIA: ICD-10-CM

## 2023-07-18 DIAGNOSIS — K76.0 HEPATIC STEATOSIS: ICD-10-CM

## 2023-07-19 NOTE — TELEPHONE ENCOUNTER
Please review. Protocol failed / Has no protocol.      Needs refill to get to appointment date:  Future Appointments   Date Time Provider Spencer Rios   8/4/2023 10:30 AM Beverly Dorsey MD [facility]       Requested Prescriptions   Pending Prescriptions Disp Refills    ATORVASTATIN 20 MG Oral Tab [Pharmacy Med Name: ATORVASTATIN 20MG TABLETS] 90 tablet 0     Sig: TAKE 1 TABLET(20 MG) BY MOUTH EVERY NIGHT       Cholesterol Medication Protocol Failed - 7/18/2023  9:19 PM        Failed - ALT in past 12 months        Failed - LDL in past 12 months        Failed - Last ALT < 80     Lab Results   Component Value Date    ALT 32 10/29/2021             Failed - Last LDL < 130     Lab Results   Component Value Date     (H) 10/29/2021             Passed - In person appointment or virtual visit in the past 12 mos or appointment in next 3 mos     Recent Outpatient Visits              1 year ago Right hand pain    [facility], [place] [address], [address] [place] [address]    Office Visit    1 year ago Chronic tension-type headache, not intractable    Sharyn Cornelius MD    Office Visit    1 year ago Vitamin D deficiency    Marcia Retana, Clarice Snellen, MD    Office Visit    1 year ago Right arm pain    [facility], [address], [place] Catheryn Gun, [facility]    Office Visit    1 year ago Jenny Neves, [place] [address], Clarice Snellen, MD    Office Visit          Future Appointments         Provider Department Appt Notes    In 2 weeks MD Marcia Tomas [place] physical policy informed to pt                  Future Appointments         Provider Department Appt Notes    In 2 weeks MD Marcia Tomas, [place] physical policy informed to pt           Recent Outpatient Visits              1 year ago Right hand pain    [address] [facility], [address] [facility], [facility]    Office Visit    1 year ago Chronic tension-type headache, not intractable    [address] [facility], Alexandria Olivas MD    Office Visit    1 year ago Vitamin D deficiency    [address] [facility], Charmayne Gloss, MD    Office Visit    1 year ago Right arm pain    [facility], [place] Shiela Coons, [place]    Office Visit    1 year ago Cough    [address], [address], Charmayne Gloss, MD    Office Visit

## 2023-07-20 RX ORDER — ATORVASTATIN CALCIUM 20 MG/1
20 TABLET, FILM COATED ORAL NIGHTLY
Qty: 30 TABLET | Refills: 0 | Status: SHIPPED | OUTPATIENT
Start: 2023-07-20

## 2024-02-16 ENCOUNTER — LAB REQUISITION (OUTPATIENT)
Dept: LAB | Facility: HOSPITAL | Age: 44
End: 2024-02-16
Payer: COMMERCIAL

## 2024-02-16 DIAGNOSIS — M67.40 GANGLION, UNSPECIFIED SITE: ICD-10-CM

## 2024-02-16 PROCEDURE — 88304 TISSUE EXAM BY PATHOLOGIST: CPT | Performed by: SURGERY

## (undated) NOTE — LETTER
11/5/2021          To Whom It May Concern:    Tammy Castillo is currently under my medical care and may not return to work at this time. Please excuse Shubham Damon for 1 days. She may return to work on 11/6/21. Activity is restricted as follows: none.

## (undated) NOTE — LETTER
5/10/2021          To Whom It May Concern:    Venkatesh Rodriguez is currently under my medical care. She was seen in the office on 5/10/21. Activity is restricted as follows: no heavy lifting for the next 6 weeks.     If you require additional information p

## (undated) NOTE — LETTER
5/14/2021          To Whom It May Concern:     Delaine Runner is currently under my medical care. She was seen in the office on 5/10/21.     Activity is restricted as follows: no lifting greater than 10 pounds for the next 6 weeks.         If you require

## (undated) NOTE — LETTER
Department: [facility] [address]  Phone: [phone]  Ordering Provider: Vanessa Sun  Ordering Provider Address: [address] [place] [address]  [id number]  Ordering Provider Phone: [phone]  Ordering Provider Fax: [phone]

## (undated) NOTE — LETTER
1/28/2022          To Whom It May Concern:    Jil Amanda is currently under my medical care and may return to work at this time. Please excuse Saniya Alex for 1 days (work absence) on 1/28/2022. She may return to work on 1/29/2022.     If you requi

## (undated) NOTE — LETTER
08/26/21        [employer]  [address] [facility] [address]      Dear Angel Valencia,    [facility] records indicate that you have outstanding lab work and or testing that was ordered for you and has not yet been completed:  Orders Placed This [place]

## (undated) NOTE — LETTER
5/9/2022          To Whom It May Concern:    Odin Lancaster is currently under my medical care. She suffered from viral syndrome last week causing vomiting, diarrhea and headaches but has since recovered. Please excuse from any missed work and she can return to work. If you require additional information please contact our office.         Sincerely,    Naomi Arreola MD          Document generated by:  Naomi Arreola MD

## (undated) NOTE — LETTER
6/18/2021          To Whom It May Concern:    Suni Blas is currently under my medical care and may not return to work at this time. Please excuse Clifton Flores for 3 days. She may return to work on 6/21/21. Activity is restricted as follows: none.

## (undated) NOTE — LETTER
5/9/2022          To Whom It May Concern:    Daniel Arevalo is currently under my medical care. She suffered from viral syndrome last week causing vomiting, diarrhea and headaches but has since recovered. Please excuse from any missed work and she can return to work with no restrictions. If you require additional information please contact our office.         Sincerely,    Christa Norman MD          Document generated by:  Christa Norman MD

## (undated) NOTE — LETTER
10/29/2021          To Whom It May Concern:    Brenda Jordan is currently under my medical care and due to her musculoskeletal pains I recommend that she work on one machine at a time while in [facility].        If you require additional information pl

## (undated) NOTE — LETTER
7/1/2021          To Whom It May Concern:     Jossue Chester is currently under my medical care. She was seen in the office on 5/10/21.     Activity is restricted as follows: no lifting greater than 10 pounds for the next 8 weeks.         If you require

## (undated) NOTE — LETTER
7/7/2022          To Whom It May Concern:    Sarahy Jaquez is currently under my medical care and may not return to work at this time. Please excuse Marah [place] for 1 days. She may return to work on 7/8/22. Activity is restricted as follows: none. If you require additional information please contact our office.         Sincerely,    MAKEDA Burkett            Document generated by:  MAKEDA Burkett